# Patient Record
(demographics unavailable — no encounter records)

---

## 2025-06-04 NOTE — REVIEW OF SYSTEMS
[Joint Pain] : joint pain [Joint Stiffness] : joint stiffness [Joint Swelling] : joint swelling [Eye Pain] : eye pain [Decrease Hearing] : decrease hearing [Nasal Discharge] : nasal discharge [Negative] : Heme/Lymph

## 2025-06-09 NOTE — PHYSICAL EXAM
[de-identified] :    Right Hip: Range of Motion in Degrees: 	                                           Claimant:	 Normal:	 Flexion (Active) 	                  120 	         120-degrees	 Flexion (Passive)	                  120	         120-degrees	 Extension (Active)	                  -30	                 -30-degrees	 Extension (Passive)	          -30	                 -30-degrees	 Abduction (Active)	                  45-50	         80-39-xadqpet	 Abduction (Passive)	          45-50	         44-65-dmibnsp	 Adduction (Active)         	          20-30	         99-20-ldbcwqq	 Adduction (Passive)	          20-30	         76-27-nuoglgp	 Internal Rotation (Active)          35	                 35-degrees	 Internal Rotation (Passive)       35                   35-degrees	 External Rotation (Active)         45	                45-degrees	 External Rotation (Passive)      45	                45-degrees	  No tenderness with internal or external rotation or axial load.  Point tenderness over the greater trochanter with pain with resisted abduction.  Negative Trendelenburg.  No weakness to flexion, extension, abduction or adduction.  No evidence of instability.  No motor or sensory deficits.  2+ DP and PT pulses.  Skin is intact.  No scars, rashes or lesions.    Left Hip: Range of Motion in Degrees: 	                                           Claimant:	 Normal:	 Flexion (Active) 	                  120 	         120-degrees	 Flexion (Passive)	                  120	         120-degrees	 Extension (Active)	                  -30	                 -30-degrees	 Extension (Passive)	          -30	                 -30-degrees	 Abduction (Active)	                  45-50	         20-18-gouvjwg	 Abduction (Passive)	          45-50	         27-29-oaudzqt	 Adduction (Active)         	          20-30	         44-67-xevwjhq	 Adduction (Passive)	          20-30	         15-02-qtxypou	 Internal Rotation (Active)          35	                 35-degrees	 Internal Rotation (Passive)       35                   35-degrees	 External Rotation (Active)         45	                45-degrees	 External Rotation (Passive)      45	                45-degrees	  No tenderness with internal or external rotation or axial load.  Point tenderness over the greater trochanter with pain with resisted abduction.  Negative Trendelenburg.  No weakness to flexion, extension, abduction or adduction.  No evidence of instability.  No motor or sensory deficits.  2+ DP and PT pulses.  Skin is intact.  No scars, rashes or lesions.    Lumbar Spine:  Note that with lying down and sitting up, he rotates to his side and has significant pain with any attempted flexion or rotation to his lumbar spine.   [de-identified] : Appearance:  Well-developed, well-nourished male in no acute distress.   [de-identified] : Gait and Station:  Ambulating with a slightly antalgic to antalgic gait.  Normal Station.  [de-identified] : Radiographs, two to three views of the right hip and pelvis taken in the office today, show no obvious osseous abnormalities. Radiographs, two to three views of the left hip and pelvis taken in the office today, show no obvious osseous abnormalities.  MRI of the hips show mild degenerative changes.

## 2025-06-09 NOTE — CONSULT LETTER
[Dear  ___] : Dear  [unfilled], [Consult Letter:] : I had the pleasure of evaluating your patient, [unfilled]. [Please see my note below.] : Please see my note below. [Consult Closing:] : Thank you very much for allowing me to participate in the care of this patient.  If you have any questions, please do not hesitate to contact me. [Sincerely,] : Sincerely, [FreeTextEntry3] : Awais Cohen, III, MD PATEL/jona

## 2025-06-09 NOTE — PROCEDURE
[de-identified] :   Indication: Trochanteric bursitis of right hip   Consent: At this time, I have recommended an injection to the right hip.  The risks and benefits of the procedure were discussed with the patient in detail.  Upon verbal consent of the patient, we proceeded with the injection as noted below.   Description of Procedure: After a sterile prep, the patient underwent an injection of approximately 9 mL of 1% Lidocaine (10 mg/mL) without epinephrine and 1 mL of triamcinolone acetonide (40 mg/mL) into the right hip.  The patient tolerated the procedure well.  There were no complications.   : [employer] Drug Name: Triamcinolone Acetonide Injectable Suspension USP NDC#: [address]-1 Lot#:  [id number] Expiration Date: 08/31/25

## 2025-06-09 NOTE — PHYSICAL EXAM
[de-identified] :    Right Hip: Range of Motion in Degrees: 	                                           Claimant:	 Normal:	 Flexion (Active) 	                  120 	         120-degrees	 Flexion (Passive)	                  120	         120-degrees	 Extension (Active)	                  -30	                 -30-degrees	 Extension (Passive)	          -30	                 -30-degrees	 Abduction (Active)	                  45-50	         50-64-mxjsgxq	 Abduction (Passive)	          45-50	         18-03-ulfgoll	 Adduction (Active)         	          20-30	         63-68-tplkxdp	 Adduction (Passive)	          20-30	         26-49-ivskzbn	 Internal Rotation (Active)          35	                 35-degrees	 Internal Rotation (Passive)       35                   35-degrees	 External Rotation (Active)         45	                45-degrees	 External Rotation (Passive)      45	                45-degrees	  No tenderness with internal or external rotation or axial load.  Point tenderness over the greater trochanter with pain with resisted abduction.  Negative Trendelenburg.  No weakness to flexion, extension, abduction or adduction.  No evidence of instability.  No motor or sensory deficits.  2+ DP and PT pulses.  Skin is intact.  No scars, rashes or lesions.    Left Hip: Range of Motion in Degrees: 	                                           Claimant:	 Normal:	 Flexion (Active) 	                  120 	         120-degrees	 Flexion (Passive)	                  120	         120-degrees	 Extension (Active)	                  -30	                 -30-degrees	 Extension (Passive)	          -30	                 -30-degrees	 Abduction (Active)	                  45-50	         81-10-rixzgdy	 Abduction (Passive)	          45-50	         42-09-txiairu	 Adduction (Active)         	          20-30	         37-90-alxrzny	 Adduction (Passive)	          20-30	         23-89-lpzxnco	 Internal Rotation (Active)          35	                 35-degrees	 Internal Rotation (Passive)       35                   35-degrees	 External Rotation (Active)         45	                45-degrees	 External Rotation (Passive)      45	                45-degrees	  No tenderness with internal or external rotation or axial load.  Point tenderness over the greater trochanter with pain with resisted abduction.  Negative Trendelenburg.  No weakness to flexion, extension, abduction or adduction.  No evidence of instability.  No motor or sensory deficits.  2+ DP and PT pulses.  Skin is intact.  No scars, rashes or lesions.    Lumbar Spine:  Note that with lying down and sitting up, he rotates to his side and has significant pain with any attempted flexion or rotation to his lumbar spine.   [de-identified] : Gait and Station:  Ambulating with a slightly antalgic to antalgic gait.  Normal Station.  [de-identified] : Appearance:  Well-developed, well-nourished male in no acute distress.   [de-identified] : Radiographs, two to three views of the right hip and pelvis taken in the office today, show no obvious osseous abnormalities. Radiographs, two to three views of the left hip and pelvis taken in the office today, show no obvious osseous abnormalities.  MRI of the hips show mild degenerative changes.

## 2025-06-09 NOTE — HISTORY OF PRESENT ILLNESS
[8] : a current pain level of 8/10 [de-identified] : The patient comes in today for his bilateral hips.  He has a long history of back issues and multiple cervical and lumbar spine surgeries.  He is being referred in for reported hip pain.  He describes his pain as anterior thigh pain, although it is not localized to palpation.  He denies any pain in groin.  He notes his pain is more across his low back.  The patient states the onset/injury occurred at least 2 years ago.  This injury is not work related or due to an automobile accident.  The patient states the pain is constant. The patient describes the pain as very sharp pain.  [de-identified] : Sitting and walking [de-identified] : Sitting on reclining chair

## 2025-06-09 NOTE — HISTORY OF PRESENT ILLNESS
[8] : a current pain level of 8/10 [de-identified] : Sitting and walking [de-identified] : The patient comes in today for his bilateral hips.  He has a long history of back issues and multiple cervical and lumbar spine surgeries.  He is being referred in for reported hip pain.  He describes his pain as anterior thigh pain, although it is not localized to palpation.  He denies any pain in groin.  He notes his pain is more across his low back.  The patient states the onset/injury occurred at least 2 years ago.  This injury is not work related or due to an automobile accident.  The patient states the pain is constant. The patient describes the pain as very sharp pain.  [de-identified] : Sitting on reclining chair

## 2025-06-09 NOTE — DISCUSSION/SUMMARY
[de-identified] : At this time, I recommended ice and elevation for the right hip trochanteric bursitis with probable lumbar spine issues with possible femoral radiculopathy.  He will be reassessed in 1 week for the left hip.

## 2025-06-09 NOTE — DISCUSSION/SUMMARY
[de-identified] : At this time, I recommended ice and elevation for the right hip trochanteric bursitis with probable lumbar spine issues with possible femoral radiculopathy.  He will be reassessed in 1 week for the left hip.

## 2025-06-09 NOTE — PROCEDURE
[de-identified] :   Indication: Trochanteric bursitis of right hip   Consent: At this time, I have recommended an injection to the right hip.  The risks and benefits of the procedure were discussed with the patient in detail.  Upon verbal consent of the patient, we proceeded with the injection as noted below.   Description of Procedure: After a sterile prep, the patient underwent an injection of approximately 9 mL of 1% Lidocaine (10 mg/mL) without epinephrine and 1 mL of triamcinolone acetonide (40 mg/mL) into the right hip.  The patient tolerated the procedure well.  There were no complications.   : [employer] Drug Name: Triamcinolone Acetonide Injectable Suspension USP NDC#: [address]-1 Lot#:  [id number] Expiration Date: 08/31/25

## 2025-06-09 NOTE — ADDENDUM
[FreeTextEntry1] : This note was written by Jass Nation on 06/04/2025, acting as a scribe for Awais Cohen III, MD

## 2025-06-10 NOTE — DISCUSSION/SUMMARY
[de-identified] : At this time, I think this is more a radiculopathic process going on here.  I do not find any significant pathology with either hip.  I saw no reason to inject his left greater trochanter because he had no significant relief to his overall complaints after the right.  I advised him to follow up with his pain management and spine specialist.

## 2025-06-10 NOTE — HISTORY OF PRESENT ILLNESS
[de-identified] : The patient comes in today for his bilateral hips.  He states although he has no pain when he presses over the region of the greater trochanter, his pain has not gone away.

## 2025-06-10 NOTE — HISTORY OF PRESENT ILLNESS
[de-identified] : The patient comes in today for his bilateral hips.  He states although he has no pain when he presses over the region of the greater trochanter, his pain has not gone away.

## 2025-06-10 NOTE — ADDENDUM
[FreeTextEntry1] : This note was written by Jass Nation on 06/10/2025, acting as a scribe for Awais Cohen III, MD

## 2025-06-10 NOTE — PHYSICAL EXAM
[de-identified] :   Right Hip: Range of Motion in Degrees:                                                     Claimant:                                Normal: Flexion (Active)                         120                                         120-degrees Flexion (Passive)                      120                                         120-degrees Extension (Active)                     -30                                         -30-degrees Extension (Passive)                  -30                                         -30-degrees Abduction (Active)                    45-50                                      03-48-jmdeuwq Abduction (Passive)                 45-50                                      19-21-dtdvtyo Adduction (Active)                    20-30                                      67-08-zoiihhw Adduction (Passive)                 20-30                                      31-10-uqrulsm Internal Rotation (Active)         35                                           35-degrees Internal Rotation (Passive)      35                                           35-degrees External Rotation (Active)        45                                           45-degrees External Rotation (Passive)     45                                           45-degrees   No tenderness with internal or external rotation or axial load.  No tenderness to palpation over the greater trochanter.  Negative Trendelenburg.  No tenderness with resisted abduction.  No weakness to flexion, extension, abduction or adduction.  No evidence of instability.  No motor or sensory deficits.  2+ DP and PT pulses.  Skin is intact.  No scars, rashes or lesions.   Left Hip: Range of Motion in Degrees: 	                                           Claimant:	 Normal:	 Flexion (Active) 	                  120 	         120-degrees	 Flexion (Passive)	                  120	         120-degrees	 Extension (Active)	                  -30	                 -30-degrees	 Extension (Passive)	          -30	                 -30-degrees	 Abduction (Active)	                  45-50	         59-97-mgbrprq	 Abduction (Passive)	          45-50	         10-31-svfqjns	 Adduction (Active)         	          20-30	         40-41-wyebwle	 Adduction (Passive)	          20-30	         73-04-npgfgtr	 Internal Rotation (Active)          35	                 35-degrees	 Internal Rotation (Passive)       35                   35-degrees	 External Rotation (Active)         45	                45-degrees	 External Rotation (Passive)      45	                45-degrees	  No tenderness with internal or external rotation or axial load.  Point tenderness over the greater trochanter with pain with resisted abduction - minimal.  Negative Trendelenburg.  No weakness to flexion, extension, abduction or adduction.  No evidence of instability.  No motor or sensory deficits.  2+ DP and PT pulses.  Skin is intact.  No scars, rashes or lesions.    Lumbar Spine:  Tenderness over the lower side of his back and up along his crest and to his anterior thigh, although we cannot palpate and reproduce the pain.      [de-identified] : Gait and Station:  Ambulating with a slightly antalgic to antalgic gait.  Normal Station.  [de-identified] : Appearance:  Well-developed, well-nourished male in no acute distress.

## 2025-06-10 NOTE — PHYSICAL EXAM
[de-identified] :   Right Hip: Range of Motion in Degrees:                                                     Claimant:                                Normal: Flexion (Active)                         120                                         120-degrees Flexion (Passive)                      120                                         120-degrees Extension (Active)                     -30                                         -30-degrees Extension (Passive)                  -30                                         -30-degrees Abduction (Active)                    45-50                                      56-75-xzjeqvg Abduction (Passive)                 45-50                                      26-49-pstbcom Adduction (Active)                    20-30                                      75-39-ctdrzxz Adduction (Passive)                 20-30                                      15-56-mrrttzx Internal Rotation (Active)         35                                           35-degrees Internal Rotation (Passive)      35                                           35-degrees External Rotation (Active)        45                                           45-degrees External Rotation (Passive)     45                                           45-degrees   No tenderness with internal or external rotation or axial load.  No tenderness to palpation over the greater trochanter.  Negative Trendelenburg.  No tenderness with resisted abduction.  No weakness to flexion, extension, abduction or adduction.  No evidence of instability.  No motor or sensory deficits.  2+ DP and PT pulses.  Skin is intact.  No scars, rashes or lesions.   Left Hip: Range of Motion in Degrees: 	                                           Claimant:	 Normal:	 Flexion (Active) 	                  120 	         120-degrees	 Flexion (Passive)	                  120	         120-degrees	 Extension (Active)	                  -30	                 -30-degrees	 Extension (Passive)	          -30	                 -30-degrees	 Abduction (Active)	                  45-50	         17-79-czgpisg	 Abduction (Passive)	          45-50	         41-19-lavhhwl	 Adduction (Active)         	          20-30	         85-12-flxfial	 Adduction (Passive)	          20-30	         28-08-tsesbik	 Internal Rotation (Active)          35	                 35-degrees	 Internal Rotation (Passive)       35                   35-degrees	 External Rotation (Active)         45	                45-degrees	 External Rotation (Passive)      45	                45-degrees	  No tenderness with internal or external rotation or axial load.  Point tenderness over the greater trochanter with pain with resisted abduction - minimal.  Negative Trendelenburg.  No weakness to flexion, extension, abduction or adduction.  No evidence of instability.  No motor or sensory deficits.  2+ DP and PT pulses.  Skin is intact.  No scars, rashes or lesions.    Lumbar Spine:  Tenderness over the lower side of his back and up along his crest and to his anterior thigh, although we cannot palpate and reproduce the pain.      [de-identified] : Gait and Station:  Ambulating with a slightly antalgic to antalgic gait.  Normal Station.  [de-identified] : Appearance:  Well-developed, well-nourished male in no acute distress.

## 2025-06-10 NOTE — DISCUSSION/SUMMARY
[de-identified] : At this time, I think this is more a radiculopathic process going on here.  I do not find any significant pathology with either hip.  I saw no reason to inject his left greater trochanter because he had no significant relief to his overall complaints after the right.  I advised him to follow up with his pain management and spine specialist.